# Patient Record
Sex: MALE | Race: WHITE | Employment: FULL TIME | URBAN - METROPOLITAN AREA
[De-identification: names, ages, dates, MRNs, and addresses within clinical notes are randomized per-mention and may not be internally consistent; named-entity substitution may affect disease eponyms.]

---

## 2024-03-04 ENCOUNTER — OFFICE VISIT (OUTPATIENT)
Age: 34
End: 2024-03-04

## 2024-03-04 VITALS
TEMPERATURE: 98.6 F | RESPIRATION RATE: 17 BRPM | WEIGHT: 195 LBS | SYSTOLIC BLOOD PRESSURE: 104 MMHG | BODY MASS INDEX: 24.25 KG/M2 | OXYGEN SATURATION: 95 % | HEIGHT: 75 IN | DIASTOLIC BLOOD PRESSURE: 70 MMHG | HEART RATE: 66 BPM

## 2024-03-04 DIAGNOSIS — L73.9 FOLLICULITIS: Primary | ICD-10-CM

## 2024-03-04 RX ORDER — CLINDAMYCIN HYDROCHLORIDE 300 MG/1
300 CAPSULE ORAL 3 TIMES DAILY
Qty: 30 CAPSULE | Refills: 0 | Status: SHIPPED | OUTPATIENT
Start: 2024-03-04 | End: 2024-03-14

## 2024-03-04 ASSESSMENT — ENCOUNTER SYMPTOMS
RESPIRATORY NEGATIVE: 1
EYES NEGATIVE: 1
GASTROINTESTINAL NEGATIVE: 1

## 2024-03-04 NOTE — PROGRESS NOTES
Kaden Newsome (:  1990) is a 33 y.o. male,New patient, here for evaluation of the following chief complaint(s):  Rash (Rash legs calf wrestle tender to the touch this morning shower tea tree oil )      ASSESSMENT/PLAN:    ICD-10-CM    1. Folliculitis  L73.9 clindamycin (CLEOCIN) 300 MG capsule          Patient is a healthy 33-year-old with 2 days history of the rash looks like little pustules.  It is not scabbing at this time.  Patient's been using over-the-counter without relief.  I suspect may be folliculitis.  Will cover for staph and strep.  Clindamycin should work [place] cover MRSA as well.  Advised to use moist heat compress if he wants to get the pustule out next his discretion.  Patient discharged in good condition patient wants to wrestle quickly.    Follow up in 7 days if symptoms persist or if symptoms worsen.    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  Noticed it past couple days.  It is little pustules noted and it spreading.  Patient has been cleaning it and looks like the rash is coming to ahead.  Patient has no fever no systemic symptoms.  Patient denies any other exposure.  Patient has had ringworm in the past.  There is no abdominal pain.  There is no redness.            Vitals:    24 1435   BP: 104/70   Pulse: 66   Resp: 17   Temp: 98.6 °F (37 °C)   SpO2: 95%   Weight: 88.5 kg (195 lb)   Height: 1.905 m (6' 3\")       Review of Systems   Constitutional: Negative.    Eyes: Negative.    Respiratory: Negative.     Cardiovascular: Negative.    Gastrointestinal: Negative.    Genitourinary: Negative.    Musculoskeletal: Negative.    Skin:  Positive for rash.   All other systems reviewed and are negative.      Physical Exam  Vitals and nursing note reviewed.   Constitutional:       Appearance: Normal appearance.   HENT:      Head: Normocephalic and atraumatic.      Right Ear: Tympanic membrane normal.      Left